# Patient Record
Sex: FEMALE | Race: OTHER | HISPANIC OR LATINO
[De-identification: names, ages, dates, MRNs, and addresses within clinical notes are randomized per-mention and may not be internally consistent; named-entity substitution may affect disease eponyms.]

---

## 2017-10-17 PROBLEM — Z00.00 ENCOUNTER FOR PREVENTIVE HEALTH EXAMINATION: Status: ACTIVE | Noted: 2017-10-17

## 2017-10-31 ENCOUNTER — RECORD ABSTRACTING (OUTPATIENT)
Age: 57
End: 2017-10-31

## 2017-10-31 DIAGNOSIS — Z78.9 OTHER SPECIFIED HEALTH STATUS: ICD-10-CM

## 2017-10-31 DIAGNOSIS — Z87.898 PERSONAL HISTORY OF OTHER SPECIFIED CONDITIONS: ICD-10-CM

## 2017-10-31 DIAGNOSIS — Z92.89 PERSONAL HISTORY OF OTHER MEDICAL TREATMENT: ICD-10-CM

## 2017-10-31 DIAGNOSIS — R30.0 DYSURIA: ICD-10-CM

## 2017-11-10 ENCOUNTER — APPOINTMENT (OUTPATIENT)
Dept: OBGYN | Facility: CLINIC | Age: 57
End: 2017-11-10

## 2022-04-07 ENCOUNTER — EMERGENCY (EMERGENCY)
Facility: HOSPITAL | Age: 62
LOS: 1 days | Discharge: HOME | End: 2022-04-07
Attending: EMERGENCY MEDICINE
Payer: MEDICAID

## 2022-04-07 VITALS
DIASTOLIC BLOOD PRESSURE: 83 MMHG | RESPIRATION RATE: 16 BRPM | HEART RATE: 90 BPM | WEIGHT: 179.9 LBS | SYSTOLIC BLOOD PRESSURE: 133 MMHG | OXYGEN SATURATION: 97 % | TEMPERATURE: 99 F | HEIGHT: 60 IN

## 2022-04-07 DIAGNOSIS — Z98.89 OTHER SPECIFIED POSTPROCEDURAL STATES: Chronic | ICD-10-CM

## 2022-04-07 PROCEDURE — 99284 EMERGENCY DEPT VISIT MOD MDM: CPT

## 2022-04-07 PROCEDURE — 99283 EMERGENCY DEPT VISIT LOW MDM: CPT | Mod: 25

## 2022-04-07 PROCEDURE — 73030 X-RAY EXAM OF SHOULDER: CPT | Mod: 26,RT

## 2022-04-07 PROCEDURE — 73030 X-RAY EXAM OF SHOULDER: CPT

## 2022-04-07 PROCEDURE — 96372 THER/PROPH/DIAG INJ SC/IM: CPT

## 2022-04-07 RX ORDER — IBUPROFEN 200 MG
1 TABLET ORAL
Qty: 28 | Refills: 0
Start: 2022-04-07 | End: 2022-04-13

## 2022-04-07 RX ORDER — LIDOCAINE 4 G/100G
1 CREAM TOPICAL ONCE
Refills: 0 | Status: COMPLETED | OUTPATIENT
Start: 2022-04-07 | End: 2022-04-07

## 2022-04-07 RX ORDER — KETOROLAC TROMETHAMINE 30 MG/ML
30 SYRINGE (ML) INJECTION ONCE
Refills: 0 | Status: DISCONTINUED | OUTPATIENT
Start: 2022-04-07 | End: 2022-04-07

## 2022-04-07 RX ORDER — METHOCARBAMOL 500 MG/1
1500 TABLET, FILM COATED ORAL ONCE
Refills: 0 | Status: COMPLETED | OUTPATIENT
Start: 2022-04-07 | End: 2022-04-07

## 2022-04-07 RX ADMIN — LIDOCAINE 1 PATCH: 4 CREAM TOPICAL at 09:05

## 2022-04-07 RX ADMIN — METHOCARBAMOL 1500 MILLIGRAM(S): 500 TABLET, FILM COATED ORAL at 09:04

## 2022-04-07 RX ADMIN — Medication 30 MILLIGRAM(S): at 09:04

## 2022-04-07 NOTE — ED PROVIDER NOTE - OBJECTIVE STATEMENT
61F presents to the ED c/o non traumatic right shoulder pain x 3 days. Pt otherwise denies recent injury/fall, neck pain, c/p, sob, abd pain, n/v/c/d, numbness/tingling and has no other complaints at this time. 61 F presents to the ED c/o non traumatic right shoulder pain x 3 days. Pt otherwise denies recent injury/fall, neck pain, c/p, sob, abd pain, n/v/c/d, numbness/tingling and has no other complaints at this time.

## 2022-04-07 NOTE — ED PROVIDER NOTE - CARE PROVIDER_API CALL
Marco Ceballos)  Orthopaedic Surgery  [address] [place] [address]  Phone: [phone]  Fax: [phone]  Follow Up Time:

## 2022-04-07 NOTE — ED PROVIDER NOTE - NS ED ATTENDING STATEMENT MOD
This was a shared visit with the LOGAN. I reviewed and verified the documentation and independently performed the documented:

## 2022-04-07 NOTE — ED PROVIDER NOTE - PATIENT PORTAL LINK FT
You can access the FollowMyHealth Patient Portal offered by [facility] by registering at the following website: [email or web address]. By joining [facility]’s FollowMyHealth portal, you will also be able to view your health information using other applications (apps) compatible with our system.

## 2022-04-07 NOTE — ED PROVIDER NOTE - MUSCULOSKELETAL, MLM
Spine appears normal, range of motion is not limited. No C/T spine midline TTP. + Right lateral shoulder/arm TTP with FROM noted. radial/median/ulnar nerve intact. SILT. 2+ radial pulse present. none

## 2022-04-12 ENCOUNTER — APPOINTMENT (OUTPATIENT)
Dept: ORTHOPEDIC SURGERY | Facility: CLINIC | Age: 62
End: 2022-04-12
Payer: MEDICAID

## 2022-04-12 VITALS
TEMPERATURE: 97.9 F | DIASTOLIC BLOOD PRESSURE: 77 MMHG | SYSTOLIC BLOOD PRESSURE: 120 MMHG | HEIGHT: 60 IN | HEART RATE: 96 BPM | BODY MASS INDEX: 32 KG/M2 | WEIGHT: 163 LBS

## 2022-04-12 DIAGNOSIS — M25.511 PAIN IN RIGHT SHOULDER: ICD-10-CM

## 2022-04-12 DIAGNOSIS — M75.81 OTHER SHOULDER LESIONS, RIGHT SHOULDER: ICD-10-CM

## 2022-04-12 PROCEDURE — 99203 OFFICE O/P NEW LOW 30 MIN: CPT

## 2022-04-12 NOTE — REASON FOR VISIT
[Initial Visit] : an initial visit for [Other: ______] : provided by KATTY [FreeTextEntry2] : right shoulder pain [Interpreters_FullName] : Donnell [Interpreters_IDNumber] : [id number] [TWNoteComboBox1] : Russian

## 2022-04-12 NOTE — PHYSICAL EXAM
[de-identified] : Physical Exam:\par General: Well appearing, no acute distress, A&O\par Neurologic: A&Ox3, No focal deficits\par Head: NCAT without abrasions, lacerations, or ecchymosis to head, face, or scalp\par Respiratory: Equal chest wall expansion bilaterally, no accessory muscle use\par Lymphatic: No lymphadenopathy palpated\par Skin: Warm and dry\par Psychiatric: Normal mood and affect\par \par RUE: \par SILT r/m/u\par Fires AIN/PIN/ulnar\par 2+ radial pulse\par brisk capillary refill\par Positive tenderness palpation over the right greater tuberosity\par  [de-identified] : Plain films of the right shoulder reviewed from the emergency department.  There is a calcific density over the greater tuberosity consistent with calcific tendinitis.

## 2022-04-12 NOTE — HISTORY OF PRESENT ILLNESS
[Bending] : worsened by bending [Lifting] : worsened by lifting [Recumbency] : relieved by recumbency [Rest] : relieved by rest [de-identified] : Patient is a [place]-speaking 61-year-old female who presents today for evaluation of right shoulder pain.  She had injection last week for shoulder. Helped some but still having pain.  States ibuprofen is increasing her blood pressure. Taking tylenol with some relief.  Has not done any conservative care or physical therapy.  The patient states the pain is made worse with activity and relieved with rest.  Radiating, 3 out of 10

## 2022-04-12 NOTE — DISCUSSION/SUMMARY
[de-identified] : The patient presents with right shoulder pain for last few weeks. The patient has tried conservative measures of treatment including rest. The patient has positive testing on clinical evaluation that  consistent with impingement and rotator cuff tendinitis. I discussed the role of the rotator cuff in shoulder function including dynamic stability and range of motion, as well as pathology that causes shoulder pain including the proximal biceps tendon, subacromial impingement, bursitis and rotator cuff dysfunction/tendinopathy. On clinical evaluation, I believe that the patient's primary concern is the pain.\par \par Nonoperative modalities of treatment include physical therapy for range of motion therapy, rotator cuff strengthening/scapular stabilization, NSAID's (if able), and subacromial corticosteroid injection. Surgical intervention would include arthroscopic rotator cuff repair for tears that are not amenable to surgical nonoperative treatment or fail a trial of nonoperative management.\par \par Considering the patient's presentation, I've recommended a trial of nonoperative treatment that includes physical therapy.  She should also discussed the possibility of anti-inflammatories with her primary care doctor. If she is still having significant symptoms despite appropriate amounts of conservative therapy, we would likely order an MRI and refer her to  physiatry or the sports service for further evaluation.  If they have any questions or concerns, I can be available at any time for further discussion.\par \par Marco Ceballos MD\par Orthopaedic Trauma Surgeon\par [facility]\par [facility]\par Director Orthopaedic Trauma, Health system\par \par \par \par

## 2024-09-12 ENCOUNTER — APPOINTMENT (OUTPATIENT)
Age: 64
End: 2024-09-12

## 2024-11-12 ENCOUNTER — APPOINTMENT (OUTPATIENT)
Dept: UROGYNECOLOGY | Facility: CLINIC | Age: 64
End: 2024-11-12

## 2024-11-12 VITALS
SYSTOLIC BLOOD PRESSURE: 160 MMHG | BODY MASS INDEX: 32.79 KG/M2 | RESPIRATION RATE: 16 BRPM | HEIGHT: 60 IN | WEIGHT: 167 LBS | HEART RATE: 78 BPM | DIASTOLIC BLOOD PRESSURE: 97 MMHG

## 2024-11-12 DIAGNOSIS — N39.41 URGE INCONTINENCE: ICD-10-CM

## 2024-11-12 DIAGNOSIS — R33.9 RETENTION OF URINE, UNSPECIFIED: ICD-10-CM

## 2024-11-12 DIAGNOSIS — R39.13 SPLITTING OF URINARY STREAM: ICD-10-CM

## 2024-11-12 DIAGNOSIS — N39.46 MIXED INCONTINENCE: ICD-10-CM

## 2024-11-12 DIAGNOSIS — N81.9 FEMALE GENITAL PROLAPSE, UNSPECIFIED: ICD-10-CM

## 2024-11-12 DIAGNOSIS — Z82.49 FAMILY HISTORY OF ISCHEMIC HEART DISEASE AND OTHER DISEASES OF THE CIRCULATORY SYSTEM: ICD-10-CM

## 2024-11-12 DIAGNOSIS — Z78.9 OTHER SPECIFIED HEALTH STATUS: ICD-10-CM

## 2024-11-12 DIAGNOSIS — R35.0 FREQUENCY OF MICTURITION: ICD-10-CM

## 2024-11-12 DIAGNOSIS — N39.0 URINARY TRACT INFECTION, SITE NOT SPECIFIED: ICD-10-CM

## 2024-11-12 DIAGNOSIS — N95.2 POSTMENOPAUSAL ATROPHIC VAGINITIS: ICD-10-CM

## 2024-11-12 LAB
BILIRUB UR QL STRIP: NEGATIVE
CLARITY UR: NORMAL
COLLECTION METHOD: NORMAL
GLUCOSE UR-MCNC: NEGATIVE
HCG UR QL: 0.2 EU/DL
HGB UR QL STRIP.AUTO: NORMAL
KETONES UR-MCNC: NORMAL
LEUKOCYTE ESTERASE UR QL STRIP: NEGATIVE
NITRITE UR QL STRIP: NEGATIVE
PH UR STRIP: 6
PROT UR STRIP-MCNC: NEGATIVE
SP GR UR STRIP: 1.03

## 2024-11-12 PROCEDURE — 99459 PELVIC EXAMINATION: CPT

## 2024-11-12 PROCEDURE — 81003 URINALYSIS AUTO W/O SCOPE: CPT | Mod: QW

## 2024-11-12 PROCEDURE — 99204 OFFICE O/P NEW MOD 45 MIN: CPT | Mod: 25

## 2024-11-12 PROCEDURE — 51701 INSERT BLADDER CATHETER: CPT | Mod: 59

## 2024-11-12 RX ORDER — ATORVASTATIN CALCIUM 80 MG/1
TABLET, FILM COATED ORAL
Refills: 0 | Status: ACTIVE | COMMUNITY

## 2024-11-12 RX ORDER — LOSARTAN POTASSIUM 100 MG/1
TABLET, FILM COATED ORAL
Refills: 0 | Status: ACTIVE | COMMUNITY

## 2024-11-12 RX ORDER — ESTRADIOL 0.1 MG/G
0.1 CREAM VAGINAL
Qty: 1 | Refills: 2 | Status: ACTIVE | COMMUNITY
Start: 2024-11-12 | End: 1900-01-01

## 2024-11-13 LAB
APPEARANCE: CLEAR
BACTERIA: NEGATIVE /HPF
BILIRUBIN URINE: NEGATIVE
BLOOD URINE: NEGATIVE
CAST: 0 /LPF
COLOR: YELLOW
EPITHELIAL CELLS: 3 /HPF
GLUCOSE QUALITATIVE U: NEGATIVE MG/DL
KETONES URINE: 15 MG/DL
LEUKOCYTE ESTERASE URINE: NEGATIVE
MICROSCOPIC-UA: NORMAL
NITRITE URINE: NEGATIVE
PH URINE: 6.5
PROTEIN URINE: NEGATIVE MG/DL
RED BLOOD CELLS URINE: 1 /HPF
REVIEW: NORMAL
SPECIFIC GRAVITY URINE: 1.02
UROBILINOGEN URINE: 0.2 MG/DL
WHITE BLOOD CELLS URINE: 1 /HPF

## 2024-11-14 LAB — BACTERIA UR CULT: NORMAL

## 2025-02-12 ENCOUNTER — APPOINTMENT (OUTPATIENT)
Dept: UROGYNECOLOGY | Facility: CLINIC | Age: 65
End: 2025-02-12